# Patient Record
Sex: FEMALE
[De-identification: names, ages, dates, MRNs, and addresses within clinical notes are randomized per-mention and may not be internally consistent; named-entity substitution may affect disease eponyms.]

---

## 2023-10-12 ENCOUNTER — NURSE TRIAGE (OUTPATIENT)
Dept: OTHER | Facility: CLINIC | Age: 28
End: 2023-10-12

## 2023-10-13 NOTE — TELEPHONE ENCOUNTER
Received call from Edy Bryant at [facility] Name: Dustin Moe MRN: [id number]    Subjective: [place] states \"[id number]\"     Current Symptoms: wants to know if she can take tylenol for back pain    Onset: 1 day ago; gradual    LMP or Due Date: 10/18/23    Weeks of gestation: 45    Fetal Movement: the patients states that the baby moves as usual    Active vaginal bleeding: denies    Vaginal fluid/discharge: denies    Contractions: 9/10 back pain, intermittent    Sexual intercourse in last 2 days: NO     (# of pregnancies): 4   Para (# of live births): 3    Past complications with pregnancy/delivery: early deliveries    Triage indicates for patient to [address] advice provided, patient verbalizes understanding; denies any other questions or concerns; instructed to call back for any new or worsening symptoms.     Home care      This triage is a result of a call to the [address]      Reason for Disposition   Back pain    Protocols used: Pregnancy - Back Pain-ADULT-